# Patient Record
Sex: FEMALE | Race: WHITE | NOT HISPANIC OR LATINO | Employment: FULL TIME | URBAN - METROPOLITAN AREA
[De-identification: names, ages, dates, MRNs, and addresses within clinical notes are randomized per-mention and may not be internally consistent; named-entity substitution may affect disease eponyms.]

---

## 2022-05-31 ENCOUNTER — TELEPHONE (OUTPATIENT)
Dept: FAMILY MEDICINE | Facility: CLINIC | Age: 37
End: 2022-05-31
Payer: COMMERCIAL

## 2022-05-31 NOTE — TELEPHONE ENCOUNTER
Spoke to pt, pt does not see any of our providers as she sees provider at [facility]. Pt stated that she will call her PCP.

## 2022-05-31 NOTE — TELEPHONE ENCOUNTER
"----- Message from Oralia Burgos sent at 5/31/2022  8:36 AM CDT -----  "Type:  Patient Call Back    Who Called:RIGO VERDUGO [[id number]]    What is the reqeust in detail:Pt requesting call back to receive an covid transfusion. Please advise    Can the clinic reply by [facility]?no    Best Call Back Number:[phone]      Additional Information:Pt tested positive for covid on May 28th            "